# Patient Record
(demographics unavailable — no encounter records)

---

## 2024-10-31 NOTE — HEALTH RISK ASSESSMENT
[Patient/Caregiver unclear of wishes] : , patient/caregiver unclear of wishes [Designated Healthcare Proxy] : Designated healthcare proxy [AdvancecareDate] : 10/24

## 2024-10-31 NOTE — HISTORY OF PRESENT ILLNESS
[Other: _____] : [unfilled] [FreeTextEntry1] : pt presents to establish care as new patient  pt presents for [facility] follow up 10/12-10/15 [de-identified] : RENAN MADRID is a 86 year female who presents today Oct 31, 2024 to establish care with new PCP.   She went to [facility] on 10/15/24 with fever and vomiting. She was found to have choledocholithiasis, sepsis secondary to klebsiella and raoutella. She underwent ERCP and had stone and sludge removed. She was discharged home with 7 days of Cipro and Flagyl which she now finished. She has been feeling great since discharge. She feels well today. She has no complaints. She has good understanding of hospital course, hospital discharge instructions, and medication compliance.   She has history of HTN, HLD, osteoporosis (last Reclast infusion August 2024), born with one kidney, and  hypothyroidism  PSH: cholecystectomy, appendicitis's, Hysterectomy, left breast mastectomy, laminectomy   She reports she received this season's flu shot. She reports she is up to date with mammogram and bone density.   She was previously living in [place] and snow birding in [place].  Dr. Sosa Casillas (primary). last seen June 2024.  She is now living with daughter

## 2024-10-31 NOTE — PHYSICAL EXAM
[Normal] : normal rate, regular rhythm, normal S1 and S2 and no murmur heard [No Edema] : there was no peripheral edema [Soft] : abdomen soft [Non Tender] : non-tender [Non-distended] : non-distended [Coordination Grossly Intact] : coordination grossly intact [No Focal Deficits] : no focal deficits [Normal Gait] : normal gait [Normal Affect] : the affect was normal [Alert and Oriented x3] : oriented to person, place, and time [Normal Insight/Judgement] : insight and judgment were intact

## 2024-12-09 NOTE — DISCUSSION/SUMMARY
[EKG obtained to assist in diagnosis and management of assessed problem(s)] : EKG obtained to assist in diagnosis and management of assessed problem(s) [FreeTextEntry1] : Pt is a 85 y/o F PMH HTN, HLD, BERTRAND, congenital single kidney  She was found to have sev BERTRAND and is planning to have CEA with Dr Anton at [facility] on 02/05/2025  check nuc stress test to eval for ischemia TTE as above Further preop recommendations will be made once diagnostic testing is complete.   HTN: well controlled cont amlodipine 5mg qd c/w metoprolol succ 50mg qd Discussed the long-term health risks of uncontrolled BP.  Advised low salt diet, regular exercise, maintaining ideal weight. Encouraged pt to check BP at home and keep journal   HLD/BERTRAND: c/w ASA 325mg qd c/w statin goal LDL <70 Advised lifestyle modifications   The described plan was discussed with the pt.  All questions and concerns were addressed to the best of my knowledge.

## 2024-12-09 NOTE — HISTORY OF PRESENT ILLNESS
[FreeTextEntry1] : Pt is a 87 y/o F who presents today for initial evaluation.  Pt has PMH HTN, HLD, BERTRAND, congenital single kidney  Pt was recently hospitalized for sepsis at [facility].  She was found to have sev BERTRAND and is planning to have CEA with Dr Anton at [facility] on 02/05/2025 She is feeling well overall - denies CP, SOB, diaphoresis, palpitations, dizziness, syncope, LE edema, PND, orthopnea.  Home 's/60-70's She is accompanied by her daughter  TTE 10/2024 EF 58%, mild TR/PI   additional PMH: breast 1988 s/p mastectomy/chemo "just taking pills" Previous hospitalizations: Previous surgeries: L mastectomy, laminectomy, aly, appy, hysterectomy - no problems with anesthesia Family hx: no SCD Smoking status: quit 1992 social ETOH no drug use Current exercise: walking and can climb stairs Daily water intake: 30 oz Daily caffeine intake: 14oz coffee OTC medications: fish oil, probiotic, niacin, B12, tylenol 7 children - no GDM/preeclampsia

## 2024-12-09 NOTE — HISTORY OF PRESENT ILLNESS
[FreeTextEntry1] : Pt is a 87 y/o F who presents today for initial evaluation.  Pt has PMH HTN, HLD, BERTRAND, congenital single kidney  Pt was recently hospitalized for sepsis at [facility].  She was found to have sev BERTRAND and is planning to have CEA with Dr Anton at [facility] on 02/05/2025 She is feeling well overall - denies CP, SOB, diaphoresis, palpitations, dizziness, syncope, LE edema, PND, orthopnea.  Home 's/60-70's She is accompanied by her daughter  TTE 10/2024 EF 58%, mild TR/PI   additional PMH: breast 1988 s/p mastectomy/chemo "just taking pills" Previous hospitalizations: Previous surgeries: L mastectomy, laminectomy, aly, appy, hysterectomy - no problems with anesthesia Family hx: no SCD Smoking status: quit 1992 social ETOH no drug use Current exercise: walking and can climb stairs Daily water intake: 30 oz Daily caffeine intake: 14oz coffee OTC medications: fish oil, probiotic, niacin, B12, tylenol 7 children - no GDM/preeclampsia

## 2024-12-09 NOTE — DISCUSSION/SUMMARY
[EKG obtained to assist in diagnosis and management of assessed problem(s)] : EKG obtained to assist in diagnosis and management of assessed problem(s) [FreeTextEntry1] : Pt is a 85 y/o F PMH HTN, HLD, BERTRAND, congenital single kidney  She was found to have sev BERTRAND and is planning to have CEA with Dr Anton at [facility] on 02/05/2025  check nuc stress test to eval for ischemia TTE as above Further preop recommendations will be made once diagnostic testing is complete.   HTN: well controlled cont amlodipine 5mg qd c/w metoprolol succ 50mg qd Discussed the long-term health risks of uncontrolled BP.  Advised low salt diet, regular exercise, maintaining ideal weight. Encouraged pt to check BP at home and keep journal   HLD/BERTRAND: c/w ASA 325mg qd c/w statin goal LDL <70 Advised lifestyle modifications   The described plan was discussed with the pt.  All questions and concerns were addressed to the best of my knowledge.